# Patient Record
Sex: FEMALE | Race: WHITE | NOT HISPANIC OR LATINO | Employment: FULL TIME | URBAN - METROPOLITAN AREA
[De-identification: names, ages, dates, MRNs, and addresses within clinical notes are randomized per-mention and may not be internally consistent; named-entity substitution may affect disease eponyms.]

---

## 2017-11-27 ENCOUNTER — OFFICE VISIT (OUTPATIENT)
Dept: RETAIL CLINIC | Facility: CLINIC | Age: 32
End: 2017-11-27

## 2017-11-27 DIAGNOSIS — Z02.1 PHYSICAL EXAM, PRE-EMPLOYMENT: Primary | ICD-10-CM

## 2017-11-27 PROCEDURE — ADULTPHYSP: Performed by: NURSE PRACTITIONER

## 2017-11-27 NOTE — PROGRESS NOTES
Patient presents for employment physical for school.  States does not need TB test done per school.  [facility] all immunizations are up to date.  Patient denies any medical problems and denies taking any medications.  Denies any problems or concerns.  Patient denies psychiatric or mental health concerns or history of mental illness; is not under the care of a psychiatrist, and denies taking any antipsychotic medications.  Patient appears alert, oriented x 4, has logical thought process; has no apparent hallucinations or active psychosis.    See scanned forms    RADAH Fontana

## 2018-08-24 ENCOUNTER — TRANSCRIBE ORDERS (OUTPATIENT)
Dept: NUTRITION | Facility: HOSPITAL | Age: 33
End: 2018-08-24

## 2018-08-24 DIAGNOSIS — E66.01 SEVERE OBESITY (HCC): Primary | ICD-10-CM

## 2018-09-06 ENCOUNTER — HOSPITAL ENCOUNTER (OUTPATIENT)
Dept: NUTRITION | Facility: HOSPITAL | Age: 33
Setting detail: RECURRING SERIES
Discharge: HOME | End: 2018-09-06

## 2018-09-06 VITALS — HEIGHT: 64 IN | BODY MASS INDEX: 35.85 KG/M2 | WEIGHT: 210 LBS

## 2018-09-06 PROCEDURE — 97802 MEDICAL NUTRITION INDIV IN: CPT | Performed by: DIETITIAN, REGISTERED

## 2018-10-03 ENCOUNTER — APPOINTMENT (OUTPATIENT)
Dept: NUTRITION | Facility: HOSPITAL | Age: 33
End: 2018-10-03

## 2018-10-08 ENCOUNTER — HOSPITAL ENCOUNTER (OUTPATIENT)
Dept: NUTRITION | Facility: HOSPITAL | Age: 33
Setting detail: RECURRING SERIES
Discharge: HOME | End: 2018-10-08

## 2018-10-08 VITALS — HEIGHT: 64 IN | WEIGHT: 208.5 LBS | BODY MASS INDEX: 35.59 KG/M2
